# Patient Record
Sex: FEMALE
[De-identification: names, ages, dates, MRNs, and addresses within clinical notes are randomized per-mention and may not be internally consistent; named-entity substitution may affect disease eponyms.]

---

## 2020-07-01 ENCOUNTER — HOSPITAL ENCOUNTER (INPATIENT)
Dept: HOSPITAL 19 - OB | Age: 30
LOS: 4 days | Discharge: HOME | End: 2020-07-05
Payer: OTHER GOVERNMENT

## 2020-07-01 VITALS — WEIGHT: 228.4 LBS | HEIGHT: 65 IN | BODY MASS INDEX: 38.05 KG/M2

## 2020-07-01 DIAGNOSIS — Z3A.39: ICD-10-CM

## 2020-07-01 DIAGNOSIS — Z67.91: ICD-10-CM

## 2020-07-01 DIAGNOSIS — O34.211: Primary | ICD-10-CM

## 2020-07-01 DIAGNOSIS — O26.893: ICD-10-CM

## 2020-07-01 DIAGNOSIS — E66.9: ICD-10-CM

## 2020-07-03 VITALS — SYSTOLIC BLOOD PRESSURE: 111 MMHG | HEART RATE: 78 BPM | DIASTOLIC BLOOD PRESSURE: 69 MMHG

## 2020-07-03 VITALS — DIASTOLIC BLOOD PRESSURE: 71 MMHG | HEART RATE: 72 BPM | TEMPERATURE: 98.2 F | SYSTOLIC BLOOD PRESSURE: 132 MMHG

## 2020-07-03 VITALS — HEART RATE: 74 BPM | DIASTOLIC BLOOD PRESSURE: 71 MMHG | TEMPERATURE: 97.7 F | SYSTOLIC BLOOD PRESSURE: 124 MMHG

## 2020-07-03 VITALS — TEMPERATURE: 98.2 F | DIASTOLIC BLOOD PRESSURE: 71 MMHG | HEART RATE: 90 BPM | SYSTOLIC BLOOD PRESSURE: 111 MMHG

## 2020-07-03 VITALS — DIASTOLIC BLOOD PRESSURE: 54 MMHG | HEART RATE: 72 BPM | SYSTOLIC BLOOD PRESSURE: 114 MMHG

## 2020-07-03 VITALS — DIASTOLIC BLOOD PRESSURE: 59 MMHG | HEART RATE: 74 BPM | SYSTOLIC BLOOD PRESSURE: 119 MMHG

## 2020-07-03 VITALS — SYSTOLIC BLOOD PRESSURE: 132 MMHG | HEART RATE: 77 BPM | DIASTOLIC BLOOD PRESSURE: 60 MMHG

## 2020-07-03 VITALS — DIASTOLIC BLOOD PRESSURE: 56 MMHG | HEART RATE: 69 BPM | SYSTOLIC BLOOD PRESSURE: 126 MMHG

## 2020-07-03 VITALS — SYSTOLIC BLOOD PRESSURE: 116 MMHG | HEART RATE: 57 BPM | DIASTOLIC BLOOD PRESSURE: 64 MMHG

## 2020-07-03 VITALS — DIASTOLIC BLOOD PRESSURE: 79 MMHG | SYSTOLIC BLOOD PRESSURE: 126 MMHG | TEMPERATURE: 98.5 F

## 2020-07-03 VITALS — HEART RATE: 71 BPM | DIASTOLIC BLOOD PRESSURE: 62 MMHG | SYSTOLIC BLOOD PRESSURE: 122 MMHG

## 2020-07-03 VITALS — DIASTOLIC BLOOD PRESSURE: 66 MMHG | SYSTOLIC BLOOD PRESSURE: 111 MMHG | HEART RATE: 75 BPM

## 2020-07-03 VITALS — DIASTOLIC BLOOD PRESSURE: 58 MMHG | SYSTOLIC BLOOD PRESSURE: 111 MMHG | HEART RATE: 88 BPM

## 2020-07-03 VITALS — HEART RATE: 72 BPM | DIASTOLIC BLOOD PRESSURE: 57 MMHG | SYSTOLIC BLOOD PRESSURE: 121 MMHG

## 2020-07-03 VITALS — SYSTOLIC BLOOD PRESSURE: 122 MMHG | HEART RATE: 77 BPM | DIASTOLIC BLOOD PRESSURE: 73 MMHG | TEMPERATURE: 98.3 F

## 2020-07-03 VITALS — SYSTOLIC BLOOD PRESSURE: 111 MMHG | HEART RATE: 76 BPM | DIASTOLIC BLOOD PRESSURE: 37 MMHG

## 2020-07-03 VITALS — DIASTOLIC BLOOD PRESSURE: 59 MMHG | SYSTOLIC BLOOD PRESSURE: 119 MMHG | HEART RATE: 77 BPM

## 2020-07-03 LAB
BASOPHILS # BLD: 0 10*3/UL (ref 0–0.2)
BASOPHILS NFR BLD AUTO: 0.2 % (ref 0–2)
EOSINOPHIL # BLD: 0.1 10*3/UL (ref 0–0.7)
EOSINOPHIL NFR BLD: 1.3 % (ref 0–4)
ERYTHROCYTE [DISTWIDTH] IN BLOOD BY AUTOMATED COUNT: 14.1 % (ref 11.5–14.5)
GRANULOCYTES # BLD AUTO: 71.9 % (ref 42.2–75.2)
HCT VFR BLD AUTO: 38 % (ref 37–47)
HGB BLD-MCNC: 12.3 G/DL (ref 12.5–16)
LYMPHOCYTES # BLD: 1.6 10*3/UL (ref 1.2–3.4)
LYMPHOCYTES NFR BLD: 18.6 % (ref 20–51)
MCH RBC QN AUTO: 27 PG (ref 27–31)
MCHC RBC AUTO-ENTMCNC: 32 G/DL (ref 33–37)
MCV RBC AUTO: 82 FL (ref 80–100)
MONOCYTES # BLD: 0.7 10*3/UL (ref 0.1–0.6)
MONOCYTES NFR BLD AUTO: 7.5 % (ref 1.7–9.3)
NEUTROPHILS # BLD: 6.3 10*3/UL (ref 1.4–6.5)
PLATELET # BLD AUTO: 242 K/MM3 (ref 130–400)
PMV BLD AUTO: 9.9 FL (ref 7.4–10.4)
RBC # BLD AUTO: 4.64 M/MM3 (ref 4.1–5.3)

## 2020-07-03 NOTE — NUR
Pt arrives ambulatory to unit with spouse for scheduled repeat  at
0600.  Pt changed into gown, EFM explained and placed.  Vitals taken.  IV
started in left hand, LR infusing per protocol.  Pt denies vaginal bleeding or
leaking of fluid, denies contractions, reports good fetal movement.
Assessment and consents complete.  Questions invited and answered.  Surgical
prep complete.

## 2020-07-03 NOTE — NUR
Fundal massage prior to discharge from OR produced avacodo size clot and small
amount of free flow.  Fundus firm with massage and down 1, dressing C/D/I.  No
further bleeding noted.  Pt awake and alert.

## 2020-07-04 VITALS — DIASTOLIC BLOOD PRESSURE: 60 MMHG | HEART RATE: 62 BPM | SYSTOLIC BLOOD PRESSURE: 117 MMHG | TEMPERATURE: 97.4 F

## 2020-07-04 VITALS — DIASTOLIC BLOOD PRESSURE: 72 MMHG | SYSTOLIC BLOOD PRESSURE: 121 MMHG | TEMPERATURE: 98 F | HEART RATE: 82 BPM

## 2020-07-04 VITALS — SYSTOLIC BLOOD PRESSURE: 133 MMHG | DIASTOLIC BLOOD PRESSURE: 70 MMHG | HEART RATE: 72 BPM | TEMPERATURE: 98.2 F

## 2020-07-04 VITALS — HEART RATE: 86 BPM | DIASTOLIC BLOOD PRESSURE: 69 MMHG | SYSTOLIC BLOOD PRESSURE: 129 MMHG

## 2020-07-04 NOTE — NUR
1545 PATIENT STATES THAT HER  IS LEAVING TONIGHT AND WAS GOING TO PICK
UP HER PRESCRIPTION FOR IBUPROFEN AND ASKING IF PROVIDER HAD WRITTEN FOR
PERCOCET AS WELL. SCRIPT GIVEN TO  TO  WHILE HE IS GONE FROM
HOSPITAL AND WHEN HE GOES TO  HER IBUPROFEN PRESCRIPTION FROM PHARMACY.

## 2020-07-05 VITALS — DIASTOLIC BLOOD PRESSURE: 75 MMHG | TEMPERATURE: 98.2 F | SYSTOLIC BLOOD PRESSURE: 138 MMHG | HEART RATE: 65 BPM

## 2020-07-05 VITALS — SYSTOLIC BLOOD PRESSURE: 121 MMHG | DIASTOLIC BLOOD PRESSURE: 85 MMHG | HEART RATE: 88 BPM | TEMPERATURE: 99.1 F
